# Patient Record
Sex: FEMALE | Race: BLACK OR AFRICAN AMERICAN
[De-identification: names, ages, dates, MRNs, and addresses within clinical notes are randomized per-mention and may not be internally consistent; named-entity substitution may affect disease eponyms.]

---

## 2019-07-01 ENCOUNTER — HOSPITAL ENCOUNTER (EMERGENCY)
Dept: HOSPITAL 47 - EC | Age: 59
LOS: 1 days | Discharge: HOME | End: 2019-07-02
Payer: COMMERCIAL

## 2019-07-01 DIAGNOSIS — F17.200: ICD-10-CM

## 2019-07-01 DIAGNOSIS — R51: Primary | ICD-10-CM

## 2019-07-01 DIAGNOSIS — Z53.20: ICD-10-CM

## 2019-07-01 DIAGNOSIS — H57.12: ICD-10-CM

## 2019-07-01 DIAGNOSIS — Z86.69: ICD-10-CM

## 2019-07-01 DIAGNOSIS — H53.8: ICD-10-CM

## 2019-07-01 PROCEDURE — 99284 EMERGENCY DEPT VISIT MOD MDM: CPT

## 2019-07-01 PROCEDURE — 96375 TX/PRO/DX INJ NEW DRUG ADDON: CPT

## 2019-07-01 PROCEDURE — 96374 THER/PROPH/DIAG INJ IV PUSH: CPT

## 2019-07-01 PROCEDURE — 70450 CT HEAD/BRAIN W/O DYE: CPT

## 2019-07-02 VITALS
RESPIRATION RATE: 16 BRPM | DIASTOLIC BLOOD PRESSURE: 84 MMHG | SYSTOLIC BLOOD PRESSURE: 134 MMHG | TEMPERATURE: 97.8 F | HEART RATE: 76 BPM

## 2019-07-02 NOTE — ED
General Adult HPI





- General


Source: patient, RN notes reviewed, old records reviewed


Mode of arrival: ambulatory


Limitations: no limitations





<Brice North - Last Filed: 07/02/19 03:57>





<Nikki Butler - Last Filed: 07/02/19 05:25>





- General


Chief complaint: Headache


Stated complaint: Migraine


Time Seen by Provider: 07/02/19 01:27





- History of Present Illness


Initial comments: 


59-year-old female patient past medical history of migraine headache disorder 

presents ED for migraine headache for approximately 2 days.  Patient does report

this is similar to her migraine headaches in the past years a states that she 

does have pain behind her left eye which is typical for her as well some blurred

vision is typical for her.  Patient denies worsening with time patient denies 

thunderclap onset, states was slow insidious onset.  Denies any recent falls or 

trauma, denies any loss of consciousness.  Patient denies any other complaints 

at this time.  Denies any chest pain shortness breath abdominal pain nausea 

vomiting or diarrhea.





Systemic: Pt denies fatigue, fever/chills, rash. Pt denies weakness, night s

weats, weight loss. 


Neuro: Pt denies headache, visual disturbances, syncope or pre-syncope.


HEENT: Pt denies ocular discharge or irritation, otalgia, rhinorrhea, 

pharyngitis or notable lymphadenopathy. 


Cardiopulmonary: Pt denies chest pain, SOB, heart palpitations, dyspnea on 

exertion.  


Abdominal/GI: Pt denies abdominal pain, n/v/d. 


: Pt denies dysuria, burning w/ urination, frequency/urgency. Denies new onset

urinary or bowel incontinence.  


MSK: Pt denies myalgia, loss of strength or function in extremities. 


Neuro: Pt denies new onset weakness, paresthesias. 


 (Brice North)





- Related Data


                                    Allergies











Allergy/AdvReac Type Severity Reaction Status Date / Time


 


No Known Allergies Allergy   Verified 07/01/19 23:46














Review of Systems


ROS Other: All systems not noted in ROS Statement are negative.





<Brice North - Last Filed: 07/02/19 03:57>


ROS Other: All systems not noted in ROS Statement are negative.





<Nikki Butler - Last Filed: 07/02/19 05:25>


ROS Statement: 


Those systems with pertinent positive or pertinent negative responses have been 

documented in the HPI.








Past Medical History


Past Medical History: No Reported History


Additional Past Medical History / Comment(s): migrains


History of Any Multi-Drug Resistant Organisms: None Reported


Past Surgical History: No Surgical Hx Reported


Past Psychological History: No Psychological Hx Reported


Smoking Status: Current every day smoker


Past Alcohol Use History: None Reported


Past Drug Use History: None Reported





<Brice North - Last Filed: 07/02/19 03:57>





General Exam


Limitations: no limitations





<Brice North - Last Filed: 07/02/19 03:57>





- General Exam Comments


Initial Comments: 





Constitutional: NAD, AOX3, Pt has pleasant affect. 


HEENT: NC/AT, trachea midline, neck supple, no lymphadenopathy. Posterior 

pharynx non erythematous, without exudates. External ears appear normal, without

discharge. Mucous membranes moist. Eyes PERRLA, EOM intact. There is no scleral 

icterus. No pallor noted. 


Cardiopulmonary: RRR, no murmurs, rubs or gallops, no JVD noted. Lungs CTAB in 

anterior and posterior fields. No peripheral edema. 


Abdominal exam: Abdomen soft and non-distended. Abdomen non-tender to palpation 

in all 4 quadrants. Bowel sounds active in LLQ. No hepatosplenomegaly. No 

ecchymosis


Neuro: CN II-XII intact. No nuchal rigidity. No raccon eyes, no tenorio sign, no 

hemotympanum. No cervical spinal tenderness. 


MSK: No posterior calf tenderness bilaterally, homans sign negative bilaterally.

Posterior tibialis and radial pulse +2 bilaterally. Sensation intact in upper 

and lower extremities. Full active ROM in upper and lower extremities, 5/5 str

egnth. 





 (Brice North)





Course





                                   Vital Signs











  07/01/19 07/02/19





  23:43 03:56


 


Temperature 98.4 F 97.8 F


 


Pulse Rate 89 76


 


Respiratory 18 16





Rate  


 


Blood Pressure 142/86 134/84


 


O2 Sat by Pulse 96 99





Oximetry  














Medical Decision Making





<Brice North - Last Filed: 07/02/19 03:57>





<Nikki Butler - Last Filed: 07/02/19 05:25>





- Medical Decision Making





59-year-old female patient past medical history of migraine headache disorder 

presents ED for migraine headache for approximately 2 days.  Patient does report

this is similar to her migraine headaches in the past years a states that she 

does have pain behind her left eye which is typical for her as well some blurred

vision is typical for her.  Patient denies worsening with time patient denies 

thunderclap onset, states was slow insidious onset.  Denies any recent falls or 

trauma, denies any loss of consciousness.  Patient denies any other complaints 

at this time.  Denies any chest pain shortness breath abdominal pain nausea 

vomiting or diarrhea.  Patient fell signs stable, afebrile.  Physical exam 

didn't display acute pathology, and a G0,


She is able limits.  Patient was offered CT, and patient would like to undergo a

computed tomography of brain.  CT did not display any acute pathology.  Patient 

headache resolved with analgesic.  Patient discharged, will follow up with 

primary care provider once days, return to ER condition worsens.  Case discussed

with Dr. Butler. 











 (Brice North)


I was available for consultation in the emergency department. The history and 

physical exam were done by the midlevel provider.  I was consulted for this 

patient's care.  I reviewed the case with the midlevel provider and based on 

their presentation of the patient, I agree with the assessment, medical decision

making and plan of care as documented.





Chart was dictated using Dragon dictation software.  Attempts were made to 

correct any dictation errors however some typographical errors may persist. 

(Nikki Butler)





Disposition


Is patient prescribed a controlled substance at d/c from ED?: No





<Birce North - Last Filed: 07/02/19 03:57>





<Nikki Butler - Last Filed: 07/02/19 05:25>


Clinical Impression: 


 Acute headache





Disposition: HOME SELF-CARE


Condition: Stable


Instructions (If sedation given, give patient instructions):  Acute Headache 

(ED)


Additional Instructions: 


Patient to adhere to previously discussed treatment plan and will take 

medication(s) as directed. Patient to follow up with PCP in 1-2 days. Patient to

return to ED if symptoms do not improve. 





Follow-up with primary care provider tomorrow, return to ER if condition 

worsens. 


Referrals: 


None,Stated [Primary Care Provider] - 1-2 days